# Patient Record
Sex: FEMALE | Race: WHITE | NOT HISPANIC OR LATINO | URBAN - METROPOLITAN AREA
[De-identification: names, ages, dates, MRNs, and addresses within clinical notes are randomized per-mention and may not be internally consistent; named-entity substitution may affect disease eponyms.]

---
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
## 2023-03-24 LAB
ALBUMIN SERPL-MCNC: 5 G/DL (ref 3.4–5)
ALP SERPL-CCNC: 80 IU/L (ref 35–126)
ALT SERPL-CCNC: 24 IU/L (ref 11–54)
ANION GAP SERPL CALC-SCNC: 11 MEQ/L (ref 3–15)
AST SERPL-CCNC: 42 IU/L (ref 15–41)
BASOPHILS # BLD: 0.04 K/UL (ref 0.01–0.1)
BASOPHILS NFR BLD: 0.4 %
BILIRUB SERPL-MCNC: 1 MG/DL (ref 0.3–1.2)
BUN SERPL-MCNC: 15 MG/DL (ref 8–20)
CALCIUM SERPL-MCNC: 9.5 MG/DL (ref 8.9–10.3)
CHLORIDE SERPL-SCNC: 104 MEQ/L (ref 98–109)
CO2 SERPL-SCNC: 24 MEQ/L (ref 22–32)
CREAT SERPL-MCNC: 0.8 MG/DL (ref 0.6–1.1)
DIFFERENTIAL METHOD BLD: ABNORMAL
EOSINOPHIL # BLD: 0.08 K/UL (ref 0.04–0.36)
EOSINOPHIL NFR BLD: 0.8 %
ERYTHROCYTE [DISTWIDTH] IN BLOOD BY AUTOMATED COUNT: 11.8 % (ref 11.7–14.4)
FLUAV RNA SPEC QL NAA+PROBE: NEGATIVE
FLUBV RNA SPEC QL NAA+PROBE: NEGATIVE
GFR SERPL CREATININE-BSD FRML MDRD: >60 ML/MIN/1.73M*2
GLUCOSE SERPL-MCNC: 94 MG/DL (ref 70–99)
HCG UR QL: NEGATIVE
HCT VFR BLDCO AUTO: 43 % (ref 35–45)
HGB BLD-MCNC: 14.3 G/DL (ref 11.8–15.7)
IMM GRANULOCYTES # BLD AUTO: 0.03 K/UL (ref 0–0.08)
IMM GRANULOCYTES NFR BLD AUTO: 0.3 %
LYMPHOCYTES # BLD: 1.84 K/UL (ref 1.2–3.5)
LYMPHOCYTES NFR BLD: 18.7 %
MCH RBC QN AUTO: 30.2 PG (ref 28–33.2)
MCHC RBC AUTO-ENTMCNC: 33.3 G/DL (ref 32.2–35.5)
MCV RBC AUTO: 90.9 FL (ref 83–98)
MONOCYTES # BLD: 0.76 K/UL (ref 0.28–0.8)
MONOCYTES NFR BLD: 7.7 %
NEUTROPHILS # BLD: 7.11 K/UL (ref 1.7–7)
NEUTS SEG NFR BLD: 72.1 %
NRBC BLD-RTO: 0 %
PDW BLD AUTO: 10.4 FL (ref 9.4–12.3)
PLATELET # BLD AUTO: 264 K/UL (ref 150–369)
POTASSIUM SERPL-SCNC: 4.1 MEQ/L (ref 3.6–5.1)
PROT SERPL-MCNC: 7.9 G/DL (ref 6–8.2)
RBC # BLD AUTO: 4.73 M/UL (ref 3.93–5.22)
RSV RNA SPEC QL NAA+PROBE: NEGATIVE
S PYO DNA THROAT QL NAA+PROBE: NOT DETECTED
SARS-COV-2 RNA RESP QL NAA+PROBE: NEGATIVE
SODIUM SERPL-SCNC: 139 MEQ/L (ref 136–144)
WBC # BLD AUTO: 9.86 K/UL (ref 3.8–10.5)

## 2023-03-24 PROCEDURE — 84703 CHORIONIC GONADOTROPIN ASSAY: CPT

## 2023-03-24 PROCEDURE — 87651 STREP A DNA AMP PROBE: CPT | Performed by: EMERGENCY MEDICINE

## 2023-03-24 PROCEDURE — 87651 STREP A DNA AMP PROBE: CPT

## 2023-03-24 PROCEDURE — 85025 COMPLETE CBC W/AUTO DIFF WBC: CPT | Performed by: EMERGENCY MEDICINE

## 2023-03-24 PROCEDURE — 87637 SARSCOV2&INF A&B&RSV AMP PRB: CPT

## 2023-03-24 PROCEDURE — 80053 COMPREHEN METABOLIC PANEL: CPT

## 2023-03-24 PROCEDURE — 86308 HETEROPHILE ANTIBODY SCREEN: CPT | Performed by: PHYSICIAN ASSISTANT

## 2023-03-24 PROCEDURE — 36415 COLL VENOUS BLD VENIPUNCTURE: CPT

## 2023-03-24 PROCEDURE — 85025 COMPLETE CBC W/AUTO DIFF WBC: CPT

## 2023-03-24 PROCEDURE — 84703 CHORIONIC GONADOTROPIN ASSAY: CPT | Performed by: EMERGENCY MEDICINE

## 2023-03-24 PROCEDURE — 86665 EPSTEIN-BARR CAPSID VCA: CPT | Performed by: PHYSICIAN ASSISTANT

## 2023-03-24 PROCEDURE — 86664 EPSTEIN-BARR NUCLEAR ANTIGEN: CPT | Performed by: PHYSICIAN ASSISTANT

## 2023-03-24 PROCEDURE — 80053 COMPREHEN METABOLIC PANEL: CPT | Performed by: EMERGENCY MEDICINE

## 2023-03-24 PROCEDURE — 87637 SARSCOV2&INF A&B&RSV AMP PRB: CPT | Performed by: EMERGENCY MEDICINE

## 2023-03-24 PROCEDURE — 99283 EMERGENCY DEPT VISIT LOW MDM: CPT

## 2023-03-25 ENCOUNTER — HOSPITAL ENCOUNTER (EMERGENCY)
Facility: HOSPITAL | Age: 23
Discharge: HOME | End: 2023-03-25
Attending: EMERGENCY MEDICINE | Admitting: EMERGENCY MEDICINE
Payer: COMMERCIAL

## 2023-03-25 ENCOUNTER — APPOINTMENT (EMERGENCY)
Dept: RADIOLOGY | Facility: HOSPITAL | Age: 23
End: 2023-03-25
Payer: COMMERCIAL

## 2023-03-25 VITALS
WEIGHT: 140 LBS | RESPIRATION RATE: 16 BRPM | OXYGEN SATURATION: 98 % | BODY MASS INDEX: 23.9 KG/M2 | HEIGHT: 64 IN | TEMPERATURE: 98.3 F | SYSTOLIC BLOOD PRESSURE: 142 MMHG | HEART RATE: 79 BPM | DIASTOLIC BLOOD PRESSURE: 76 MMHG

## 2023-03-25 DIAGNOSIS — J04.0 LARYNGITIS: Primary | ICD-10-CM

## 2023-03-25 DIAGNOSIS — K12.2 UVULITIS: ICD-10-CM

## 2023-03-25 PROCEDURE — 63600000 HC DRUGS/DETAIL CODE: Performed by: PHYSICIAN ASSISTANT

## 2023-03-25 PROCEDURE — 71046 X-RAY EXAM CHEST 2 VIEWS: CPT

## 2023-03-25 RX ORDER — DEXAMETHASONE SODIUM PHOSPHATE 10 MG/ML
10 INJECTION INTRAMUSCULAR; INTRAVENOUS ONCE
Status: COMPLETED | OUTPATIENT
Start: 2023-03-25 | End: 2023-03-25

## 2023-03-25 RX ADMIN — DEXAMETHASONE SODIUM PHOSPHATE 10 MG: 10 INJECTION INTRAMUSCULAR; INTRAVENOUS at 01:53

## 2023-03-25 ASSESSMENT — ENCOUNTER SYMPTOMS
MYALGIAS: 0
DIARRHEA: 1
CHILLS: 1
VOMITING: 0
SHORTNESS OF BREATH: 0
NAUSEA: 1
FEVER: 1
VOICE CHANGE: 1
SORE THROAT: 1
TROUBLE SWALLOWING: 0
DIFFICULTY URINATING: 0
COUGH: 1
HEADACHES: 0
ABDOMINAL PAIN: 0

## 2023-03-25 NOTE — ED PROVIDER NOTES
Emergency Medicine Note  HPI   HISTORY OF PRESENT ILLNESS     23-year-old female with no significant past medical history presents to ED for evaluation of sore throat.  Patient reports starting with productive cough and yellow sputum 6 days ago.  Has noticed nasal congestion, nausea, few episodes of diarrhea.  For the past 48 hours has had a sore throat and chills.  She reports yesterday losing her voice and felt her throat was swollen.  She goes to [place] and went to the [facility] where she had a negative strep test, and has a monotest with results pending.  She was given ibuprofen and advised to take Mucinex, Claritin, nasal spray for suspected allergy induced symptoms per patient.  She reports worsening symptoms this evening, last took Motrin at 9:30 PM, and came here for further evaluation.  She denies vomiting, myalgia, rash, headache, chest pain, abdominal pain.  No recent travel.  No known mono or strep exposure      History provided by:  Patient  Sore Throat  Associated symptoms: chills (subjective), cough, fever (subjective) and voice change    Associated symptoms: no abdominal pain, no chest pain, no drooling, no ear pain, no headaches, no rash, no shortness of breath and no trouble swallowing          Patient History   PAST HISTORY     Reviewed from Nursing Triage:       No past medical history on file.    No past surgical history on file.    No family history on file.           Review of Systems   REVIEW OF SYSTEMS     Review of Systems   Constitutional: Positive for chills (subjective) and fever (subjective).   HENT: Positive for congestion, sore throat and voice change. Negative for dental problem, drooling, ear pain, facial swelling and trouble swallowing.    Eyes: Negative for visual disturbance.   Respiratory: Positive for cough. Negative for shortness of breath.    Cardiovascular: Negative for chest pain.   Gastrointestinal: Positive for diarrhea and nausea. Negative for abdominal pain and  vomiting.   Genitourinary: Negative for difficulty urinating.   Musculoskeletal: Negative for myalgias.   Skin: Negative for rash.   Allergic/Immunologic: Negative for immunocompromised state.   Neurological: Negative for syncope and headaches.         VITALS     ED Vitals    Date/Time Temp Pulse Resp BP SpO2 [facility]   03/25/23 0342 36.8 °C (98.3 °F) 79 16 142/76 98 % NMN   03/25/23 0157 36.6 °C (97.8 °F) 73 20 144/95 99 % AE   03/24/23 2235 36.5 °C (97.7 °F) 71 22 138/92 99 % KRL        Pulse Ox %: 99 % (03/25/23 0157)  Pulse Ox Interpretation: Normal (03/25/23 0157)           Physical Exam   PHYSICAL EXAM     Physical Exam  Vitals and nursing note reviewed.   Constitutional:       General: She is not in acute distress.  HENT:      Head: Normocephalic.      Jaw: No trismus.      Right Ear: Tympanic membrane normal.      Left Ear: Tympanic membrane normal.      Nose: Nose normal.      Mouth/Throat:      Mouth: Mucous membranes are moist.      Pharynx: Oropharynx is clear. Uvula midline. Uvula swelling (And mild injection) present. No pharyngeal swelling, oropharyngeal exudate or posterior oropharyngeal erythema.      Tonsils: No tonsillar exudate or tonsillar abscesses.      Comments: Patient with hoarse voice, handling oral secretions, airway patent, no tripod, no tongue elevation  Eyes:      Conjunctiva/sclera: Conjunctivae normal.   Cardiovascular:      Rate and Rhythm: Normal rate and regular rhythm.      Heart sounds: No murmur heard.  Pulmonary:      Effort: Pulmonary effort is normal. No respiratory distress.      Breath sounds: Normal breath sounds. No stridor. No wheezing or rhonchi.   Abdominal:      Palpations: Abdomen is soft. There is no hepatomegaly or splenomegaly.      Tenderness: There is no abdominal tenderness. There is no guarding or rebound.   Musculoskeletal:      Cervical back: Normal range of motion and neck supple. No rigidity.      Right lower leg: No edema.      Left lower leg: No edema.    Lymphadenopathy:      Cervical: No cervical adenopathy.   Skin:     General: Skin is warm and dry.   Neurological:      Mental Status: She is alert.   Psychiatric:         Mood and Affect: Mood normal.           PROCEDURES     Procedures     DATA     Results     Procedure Component Value Units Date/Time    Heterophile AB Reflex EBV Evaluation [[id number]] Collected: 03/24/23 2252    Specimen: Blood, Venous Updated: 03/25/23 0115    SARS-CoV-2 (COVID-19), PCR Nasopharynx [[id number]]  (Normal) Collected: 03/24/23 2244    Specimen: Nasopharyngeal Swab from Nasopharynx Updated: 03/24/23 2341    Narrative:      The following orders were created for panel order SARS-CoV-2 (COVID-19), PCR Nasopharynx.  Procedure                               Abnormality         Status                     ---------                               -----------         ------                     SARS-COV-2 (COVID-19)/ F...[[id number]]  Normal              Final result                 Please view results for these tests on the individual orders.    SARS-COV-2 (COVID-19)/ FLU A/B, AND RSV, PCR Nasopharynx [[id number]]  (Normal) Collected: 03/24/23 2244    Specimen: Nasopharyngeal Swab from Nasopharynx Updated: 03/24/23 2341     SARS-CoV-2 (COVID-19) Negative     Influenza A Negative     Influenza B Negative     Respiratory Syncytial Virus Negative    Narrative:      Testing performed using real-time PCR for detection of COVID-19. EUA approved validation studies performed on site.     Comprehensive metabolic panel [[id number]]  (Abnormal) Collected: 03/24/23 2252    Specimen: Blood, Venous Updated: 03/24/23 2339     Sodium 139 mEQ/L      Potassium 4.1 mEQ/L      Comment: Results obtained on plasma. Plasma Potassium values may be up to 0.4 mEQ/L less than serum values. The differences may be greater for patients with high platelet or white cell counts.  SLIGHT HEMOLYSIS, RESULT MAY BE INCREASED.        Chloride 104 mEQ/L      CO2 24 mEQ/L      BUN 15  mg/dL      Creatinine 0.8 mg/dL      Glucose 94 mg/dL      Calcium 9.5 mg/dL      AST (SGOT) 42 IU/L      Comment: SLIGHT HEMOLYSIS, RESULT MAY BE INCREASED.        ALT (SGPT) 24 IU/L      Comment: SLIGHT HEMOLYSIS, RESULT MAY BE INCREASED.        Alkaline Phosphatase 80 IU/L      Total Protein 7.9 g/dL      Comment: Test performed on plasma which typically contains approximately 0.4 g/dL more protein than serum.        Albumin 5.0 g/dL      Bilirubin, Total 1.0 mg/dL      Comment: SLIGHT HEMOLYSIS, RESULT MAY BE INCREASED.        eGFR >60.0 mL/min/1.73m*2      Anion Gap 11 mEQ/L     BhCG, Serum, Qual [[id number]]  (Normal) Collected: 03/24/23 2252    Specimen: Blood, Venous Updated: 03/24/23 2332     Preg Test, Serum Negative    Group A Strep by PCR, Throat Throat Swab [[id number]]  (Normal) Collected: 03/24/23 2244    Specimen: Throat Swab Updated: 03/24/23 2330     Strep A PCR, Throat Not Detected    CBC and differential [[id number]]  (Abnormal) Collected: 03/24/23 2252    Specimen: Blood, Venous Updated: 03/24/23 2305     WBC 9.86 K/uL      RBC 4.73 M/uL      Hemoglobin 14.3 g/dL      Hematocrit 43.0 %      MCV 90.9 fL      MCH 30.2 pg      MCHC 33.3 g/dL      RDW 11.8 %      Platelets 264 K/uL      MPV 10.4 fL      Differential Type Auto     nRBC 0.0 %      Immature Granulocytes 0.3 %      Neutrophils 72.1 %      Lymphocytes 18.7 %      Monocytes 7.7 %      Eosinophils 0.8 %      Basophils 0.4 %      Immature Granulocytes, Absolute 0.03 K/uL      Neutrophils, Absolute 7.11 K/uL      Lymphocytes, Absolute 1.84 K/uL      Monocytes, Absolute 0.76 K/uL      Eosinophils, Absolute 0.08 K/uL      Basophils, Absolute 0.04 K/uL           Imaging Results          X-RAY CHEST 2 VIEWS (Final result)  Result time 03/25/23 09:12:20    Final result                 Impression:    IMPRESSION:  No active disease is seen in the chest.             Narrative:    CLINICAL HISTORY: Cough    COMMENT:    PA and lateral views of the chest  show clear lung fields.  The  heart, pulmonary vasculature and mediastinum are within normal limits. No prior  similar studies for comparison.                    Wet Read    NAD  Reviewed with attending                              No orders to display       Scoring tools                                  ED Course & MDM   MDM / ED COURSE / CLINICAL IMPRESSION / DISPO     Medical Decision Making  ddx considered but not limited to strep, mono, covid, flu, pneumonia, laryngitis, uvulitis     Amount and/or Complexity of Data Reviewed  External Data Reviewed:      Details: no previous CXRs  Labs: ordered. Decision-making details documented in ED Course.  Radiology: ordered and independent interpretation performed.      Risk  Prescription drug management.          ED Course as of 03/26/23 0007   Sat Mar 25, 2023   0110 WBC: 9.86 [TC]   0239 Strep A PCR, Throat: Not Detected [TC]   0239 Negative COVID/flu/RSV [TC]   0302 Suspect viral etiology for uvulitis and laryngitis.  Supportive treatment reviewed.  ENT referral provided if needed.  Close follow-up with Dr. Deepa [facility] advised.  Strict return precautions given. [TC]      ED Course User Index  [TC] Saima Herring PA C     Clinical Impression      Laryngitis   Uvulitis     _________________     ED Disposition   Discharge                   Saima Herring PA C  03/26/23 0010

## 2023-03-25 NOTE — ED ATTESTATION NOTE
I have personally seen and examined Genet Davis.  I personally performed the key components of the encounter and provided a substantive portion of the care and medical decision making for this patient.    I reviewed and agree with physician assistant / nurse practitioner’s assessment and plan of care, with any exceptions as documented below.      My focused history, examination, assessment, and plan of care of Genet Davis is as follows:    Brief History:  HPI  23-year-old female presents to the emergency department with sore throat.  She is a college student, seen at [facility], told it might be allergies.  Now lost her voice for the past day or 2.  Has had 5 days of a cough.      Focused Physical Exam:  Physical Exam  Vital signs noted reassuring  Nontoxic, no tripoding, controlling secretions  She does have a hoarse voice  No trismus  Oropharynx shows minimal uvular swelling, it is midline, no tonsillar hypertrophy or exudates noted, no lingual elevation  No respiratory distress      Assessment / Plan / MDM:  MDM  Laboratory studies sent from triage largely reassuring, negative strep, negative COVID.    Plan Decadron, patient made aware that EBV testing is delayed and she will be contacted if +edin. .    This patient is in the Emergency Department when we are in a period with increased volumes and decreased capacity.         I was physically present for the key/critical portions of the following procedures:  Procedures           Minesh Lindquist,   03/25/23 3205

## 2023-03-25 NOTE — DISCHARGE INSTRUCTIONS
Take 600 mg of Motrin with food every 6 hours as needed for pain as instructed on the bottle    Take 650 mg of Tylenol every 4 hours as needed for pain as instructed on the bottle     Return to the emergency department for worsening of symptoms or if you develop any new concerning symptoms including unable to swallow, drooling, difficulty breathing, fainting.  Okay    Follow up with your family doctor within 48 hours for re-evaluation and further treatment and monitoring.     We did send a mono test off today, no contact sports or strenuous activities until you have confirmed a negative mono test.  You can make a mainline patient portal to confirm the results, you will only receive a call about positive results.

## 2023-03-26 ASSESSMENT — ENCOUNTER SYMPTOMS: FACIAL SWELLING: 0

## 2023-03-27 LAB — HETEROPH AB SER QL LA: NEGATIVE

## 2023-03-28 LAB
EBV NA 1 IGG SER-ACNC: 4.24
EBV VCA IGG SER IA-ACNC: 2.46
EBV VCA IGM SER IA-ACNC: 0.26
INTERPRETATION: ABNORMAL